# Patient Record
Sex: MALE | Race: WHITE | NOT HISPANIC OR LATINO | Employment: FULL TIME | URBAN - METROPOLITAN AREA
[De-identification: names, ages, dates, MRNs, and addresses within clinical notes are randomized per-mention and may not be internally consistent; named-entity substitution may affect disease eponyms.]

---

## 2023-03-16 ENCOUNTER — TELEPHONE (OUTPATIENT)
Dept: PRIMARY CARE | Facility: CLINIC | Age: 53
End: 2023-03-16
Payer: COMMERCIAL

## 2023-03-16 DIAGNOSIS — M54.50 CHRONIC LOW BACK PAIN WITHOUT SCIATICA, UNSPECIFIED BACK PAIN LATERALITY: Primary | ICD-10-CM

## 2023-03-16 DIAGNOSIS — G89.29 CHRONIC LOW BACK PAIN WITHOUT SCIATICA, UNSPECIFIED BACK PAIN LATERALITY: Primary | ICD-10-CM

## 2023-03-28 PROBLEM — R94.31 ABNORMAL EKG: Status: ACTIVE | Noted: 2023-03-28

## 2023-03-28 PROBLEM — J02.9 SORE THROAT: Status: ACTIVE | Noted: 2023-03-28

## 2023-03-28 PROBLEM — R00.2 PALPITATIONS: Status: ACTIVE | Noted: 2023-03-28

## 2023-03-28 PROBLEM — N52.9 ERECTILE DYSFUNCTION: Status: ACTIVE | Noted: 2023-03-28

## 2023-03-28 PROBLEM — S61.219A LACERATION OF FINGER, RIGHT: Status: ACTIVE | Noted: 2023-03-28

## 2023-03-28 PROBLEM — L03.314 CELLULITIS OF LEFT GROIN: Status: ACTIVE | Noted: 2023-03-28

## 2023-03-28 PROBLEM — M54.17 LUMBOSACRAL NEURITIS: Status: ACTIVE | Noted: 2023-03-28

## 2023-03-28 PROBLEM — I71.20 THORACIC AORTIC ANEURYSM (TAA) (CMS-HCC): Status: ACTIVE | Noted: 2023-03-28

## 2023-03-28 PROBLEM — J98.01 BRONCHIAL SPASMS: Status: ACTIVE | Noted: 2023-03-28

## 2023-03-28 PROBLEM — I28.8 ENLARGED PULMONARY ARTERY (MULTI): Status: ACTIVE | Noted: 2023-03-28

## 2023-03-28 PROBLEM — A08.4 VIRAL GASTROENTERITIS: Status: ACTIVE | Noted: 2023-03-28

## 2023-03-28 PROBLEM — I25.84 CORONARY ARTERY CALCIFICATION: Status: ACTIVE | Noted: 2023-03-28

## 2023-03-28 PROBLEM — F17.200 NICOTINE DEPENDENCE: Status: ACTIVE | Noted: 2023-03-28

## 2023-03-28 PROBLEM — M48.07 LUMBOSACRAL STENOSIS: Status: ACTIVE | Noted: 2023-03-28

## 2023-03-28 PROBLEM — M51.26 LUMBAR DISC HERNIATION: Status: ACTIVE | Noted: 2023-03-28

## 2023-03-28 PROBLEM — E66.811 OBESITY (BMI 30.0-34.9): Status: ACTIVE | Noted: 2023-03-28

## 2023-03-28 PROBLEM — R53.83 FATIGUE: Status: ACTIVE | Noted: 2023-03-28

## 2023-03-28 PROBLEM — D72.829 LEUKOCYTOSIS: Status: ACTIVE | Noted: 2023-03-28

## 2023-03-28 PROBLEM — L27.0 DRUG RASH: Status: ACTIVE | Noted: 2023-03-28

## 2023-03-28 PROBLEM — G89.29 CHRONIC LOW BACK PAIN: Status: ACTIVE | Noted: 2023-03-28

## 2023-03-28 PROBLEM — I25.10 CORONARY ARTERY CALCIFICATION: Status: ACTIVE | Noted: 2023-03-28

## 2023-03-28 PROBLEM — S81.802A LEG WOUND, LEFT: Status: ACTIVE | Noted: 2023-03-28

## 2023-03-28 PROBLEM — E78.5 HYPERLIPEMIA: Status: ACTIVE | Noted: 2023-03-28

## 2023-03-28 PROBLEM — M47.816 SPONDYLOSIS OF LUMBAR REGION WITHOUT MYELOPATHY OR RADICULOPATHY: Status: ACTIVE | Noted: 2023-03-28

## 2023-03-28 PROBLEM — L03.314 CELLULITIS OF GROIN, RIGHT: Status: ACTIVE | Noted: 2023-03-28

## 2023-03-28 PROBLEM — M54.50 CHRONIC LOW BACK PAIN: Status: ACTIVE | Noted: 2023-03-28

## 2023-03-28 PROBLEM — D72.829 ELEVATED WHITE BLOOD CELL COUNT: Status: ACTIVE | Noted: 2023-03-28

## 2023-03-28 PROBLEM — E66.9 OBESITY (BMI 30.0-34.9): Status: ACTIVE | Noted: 2023-03-28

## 2023-03-28 PROBLEM — T14.8XXA INFECTED WOUND: Status: ACTIVE | Noted: 2023-03-28

## 2023-03-28 PROBLEM — H66.91 OTITIS MEDIA, RIGHT: Status: ACTIVE | Noted: 2023-03-28

## 2023-03-28 PROBLEM — R79.89 ABNORMAL LFTS: Status: ACTIVE | Noted: 2023-03-28

## 2023-03-28 PROBLEM — L08.9 INFECTED WOUND: Status: ACTIVE | Noted: 2023-03-28

## 2023-03-28 PROBLEM — G47.19 EXCESSIVE DAYTIME SLEEPINESS: Status: ACTIVE | Noted: 2023-03-28

## 2023-03-28 PROBLEM — R40.0 HAS DAYTIME DROWSINESS: Status: ACTIVE | Noted: 2023-03-28

## 2023-03-28 PROBLEM — M25.561 RIGHT KNEE PAIN: Status: ACTIVE | Noted: 2023-03-28

## 2023-03-28 PROBLEM — R05.8 COUGH WITH SPUTUM: Status: ACTIVE | Noted: 2023-03-28

## 2023-03-28 RX ORDER — BUPRENORPHINE AND NALOXONE 8; 2 MG/1; MG/1
FILM, SOLUBLE BUCCAL; SUBLINGUAL
COMMUNITY
Start: 2022-03-24

## 2023-03-28 RX ORDER — ATORVASTATIN CALCIUM 20 MG/1
1 TABLET, FILM COATED ORAL NIGHTLY
COMMUNITY
Start: 2023-01-16

## 2023-03-28 RX ORDER — GABAPENTIN 800 MG/1
1 TABLET ORAL 3 TIMES DAILY
COMMUNITY
Start: 2020-09-02 | End: 2023-04-24 | Stop reason: SDUPTHER

## 2023-03-28 RX ORDER — MULTIVITAMIN
1 TABLET ORAL DAILY
COMMUNITY
End: 2024-01-30 | Stop reason: WASHOUT

## 2023-03-29 ENCOUNTER — OFFICE VISIT (OUTPATIENT)
Dept: PRIMARY CARE | Facility: CLINIC | Age: 53
End: 2023-03-29
Payer: COMMERCIAL

## 2023-03-29 VITALS
HEIGHT: 75 IN | WEIGHT: 287 LBS | HEART RATE: 92 BPM | DIASTOLIC BLOOD PRESSURE: 75 MMHG | TEMPERATURE: 96.9 F | BODY MASS INDEX: 35.68 KG/M2 | SYSTOLIC BLOOD PRESSURE: 115 MMHG

## 2023-03-29 DIAGNOSIS — J03.90 TONSILLITIS: Primary | ICD-10-CM

## 2023-03-29 PROCEDURE — 99214 OFFICE O/P EST MOD 30 MIN: CPT | Performed by: NURSE PRACTITIONER

## 2023-03-29 RX ORDER — AZITHROMYCIN 250 MG/1
TABLET, FILM COATED ORAL
Qty: 6 TABLET | Refills: 0 | Status: SHIPPED | OUTPATIENT
Start: 2023-03-29 | End: 2023-04-03

## 2023-03-29 ASSESSMENT — ENCOUNTER SYMPTOMS
CHILLS: 1
COUGH: 1
SORE THROAT: 1
OCCASIONAL FEELINGS OF UNSTEADINESS: 0
LOSS OF SENSATION IN FEET: 0
DEPRESSION: 0

## 2023-03-29 ASSESSMENT — PATIENT HEALTH QUESTIONNAIRE - PHQ9
SUM OF ALL RESPONSES TO PHQ9 QUESTIONS 1 AND 2: 0
1. LITTLE INTEREST OR PLEASURE IN DOING THINGS: NOT AT ALL
2. FEELING DOWN, DEPRESSED OR HOPELESS: NOT AT ALL

## 2023-03-29 NOTE — LETTER
March 29, 2023     Patient: Jaleel Ferris   YOB: 1970   Date of Visit: 3/29/2023       To Whom It May Concern:    Jaleel Ferris was seen in my clinic on 3/29/2023 at 8:30 am. Please excuse Jaleel for his absence from work on this day to make the appointment.  Patient may return to work on Thursday, March 30, 2023.    If you have any questions or concerns, please don't hesitate to call.         Sincerely,         Barb Cat, VIJAYA-CNP        CC: No Recipients

## 2023-03-29 NOTE — PATIENT INSTRUCTIONS
You have been diagnosed with a tonsillitis.  Strep culture have been sent we will notify you with positive results only.  Warm salt water gargles as needed.  Tylenol or ibuprofen as needed.  Increase fluids.  As previously discussed please start over-the-counter vitamin D3 2000 international units 1 tablet daily.  You may return to work on Thursday, March 30, 2023,  As long as you do not have fever

## 2023-03-29 NOTE — PROGRESS NOTES
"Subjective   Patient ID: Jaleel Ferris is a 53 y.o. male who presents for Sore Throat (Pt presents today for sore throat, green phlegm. ).    Sore Throat   Associated symptoms include coughing.      Patient complaining of feeling ill x3 days.  States he was exposed to his son who was having respiratory symptoms.  Patient has no concerns for COVID-19.  He complains of sore throat and swollen neck glands and pain with swallowing.  Able to eat and drink without difficulty.    Review of Systems   Constitutional:  Positive for chills.   HENT:  Positive for sore throat.    Respiratory:  Positive for cough.         Yellow brown phlegm       Objective   /75   Pulse 92   Temp 36.1 °C (96.9 °F)   Ht 1.905 m (6' 3\")   Wt 130 kg (287 lb)   BMI 35.87 kg/m²     Physical Exam  Vitals reviewed.   Constitutional:       Appearance: Normal appearance. He is not toxic-appearing.   HENT:      Right Ear: Tympanic membrane, ear canal and external ear normal.      Left Ear: Tympanic membrane, ear canal and external ear normal.      Mouth/Throat:      Mouth: Mucous membranes are moist.      Pharynx: Posterior oropharyngeal erythema present. No oropharyngeal exudate.      Tonsils: No tonsillar exudate or tonsillar abscesses. 2+ on the right. 2+ on the left.   Cardiovascular:      Rate and Rhythm: Normal rate and regular rhythm.   Pulmonary:      Effort: Pulmonary effort is normal.      Breath sounds: Normal breath sounds.   Lymphadenopathy:      Cervical: Cervical adenopathy present.   Skin:     General: Skin is warm and dry.   Neurological:      Mental Status: He is alert and oriented to person, place, and time.         Assessment/Plan   Problem List Items Addressed This Visit    None  Visit Diagnoses       Tonsillitis    -  Primary    Relevant Medications    azithromycin (Zithromax) 250 mg tablet    Other Relevant Orders    Group A Streptococcus, PCR            PCR  Strep culture sent.           Warm salt water gargles as " needed.           Tylenol or ibuprofen as needed.            Increase fluids            Follow-up as needed.

## 2023-04-03 PROCEDURE — 87651 STREP A DNA AMP PROBE: CPT

## 2023-04-06 ENCOUNTER — TELEPHONE (OUTPATIENT)
Dept: PRIMARY CARE | Facility: CLINIC | Age: 53
End: 2023-04-06
Payer: COMMERCIAL

## 2023-04-06 LAB — GROUP A STREP, PCR: DETECTED

## 2023-04-06 NOTE — TELEPHONE ENCOUNTER
Patient notified of lab results.  Patient was treated with Z-Pj during visit.  Patient states he is feeling much better.  No longer have sore throat.

## 2023-04-06 NOTE — TELEPHONE ENCOUNTER
LAB called, states positive STREP. Informed us they updated our phone number in their system, so no further test results are delayed.

## 2023-04-24 ENCOUNTER — TELEPHONE (OUTPATIENT)
Dept: PRIMARY CARE | Facility: CLINIC | Age: 53
End: 2023-04-24
Payer: COMMERCIAL

## 2023-04-24 DIAGNOSIS — G89.29 OTHER CHRONIC PAIN: Primary | ICD-10-CM

## 2023-04-24 RX ORDER — GABAPENTIN 800 MG/1
800 TABLET ORAL 3 TIMES DAILY
Qty: 180 TABLET | Refills: 2 | Status: SHIPPED | OUTPATIENT
Start: 2023-04-24 | End: 2023-10-06 | Stop reason: SDUPTHER

## 2023-04-24 NOTE — TELEPHONE ENCOUNTER
Rx Refill Request Telephone Encounter    Name:  Jaleel Ferris  :  [id number]  Medication Name:  GABAPENTIN 800MG ORAL TABLETS            Specific Pharmacy location:  [facility]  Date of last appointment:    Date of next appointment:    Best number to reach patient:

## 2023-10-05 DIAGNOSIS — G89.29 OTHER CHRONIC PAIN: ICD-10-CM

## 2023-10-05 NOTE — TELEPHONE ENCOUNTER
Rx Refill Request Telephone Encounter    Name:  Jaleel Ferris  :  [id number]  Medication Name:        GABAPENTIN 800 MG TABLET      Specific Pharmacy location:  [facility] [place] [place]  Date of last appointment:  23  Date of next appointment:  10/10/23  Best number to reach patient:

## 2023-10-06 RX ORDER — GABAPENTIN 800 MG/1
800 TABLET ORAL 3 TIMES DAILY
Qty: 180 TABLET | Refills: 1 | OUTPATIENT
Start: 2023-10-06

## 2023-10-06 RX ORDER — GABAPENTIN 800 MG/1
800 TABLET ORAL 3 TIMES DAILY
Qty: 180 TABLET | Refills: 1 | Status: SHIPPED | OUTPATIENT
Start: 2023-10-06 | End: 2024-01-30 | Stop reason: SDUPTHER

## 2023-10-10 ENCOUNTER — APPOINTMENT (OUTPATIENT)
Dept: PRIMARY CARE | Facility: CLINIC | Age: 53
End: 2023-10-10
Payer: COMMERCIAL

## 2023-10-11 ENCOUNTER — OFFICE VISIT (OUTPATIENT)
Dept: PRIMARY CARE | Facility: CLINIC | Age: 53
End: 2023-10-11
Payer: COMMERCIAL

## 2023-10-11 VITALS
DIASTOLIC BLOOD PRESSURE: 82 MMHG | RESPIRATION RATE: 18 BRPM | HEART RATE: 72 BPM | WEIGHT: 285.5 LBS | BODY MASS INDEX: 35.69 KG/M2 | TEMPERATURE: 98.3 F | OXYGEN SATURATION: 99 % | SYSTOLIC BLOOD PRESSURE: 126 MMHG

## 2023-10-11 DIAGNOSIS — M54.50 CHRONIC LOW BACK PAIN WITHOUT SCIATICA, UNSPECIFIED BACK PAIN LATERALITY: ICD-10-CM

## 2023-10-11 DIAGNOSIS — J02.9 PHARYNGITIS, UNSPECIFIED ETIOLOGY: Primary | ICD-10-CM

## 2023-10-11 DIAGNOSIS — G89.29 CHRONIC LOW BACK PAIN WITHOUT SCIATICA, UNSPECIFIED BACK PAIN LATERALITY: ICD-10-CM

## 2023-10-11 DIAGNOSIS — J02.9 SORE THROAT: ICD-10-CM

## 2023-10-11 PROCEDURE — 87651 STREP A DNA AMP PROBE: CPT

## 2023-10-11 PROCEDURE — 99214 OFFICE O/P EST MOD 30 MIN: CPT | Performed by: NURSE PRACTITIONER

## 2023-10-11 RX ORDER — LIDOCAINE 50 MG/G
1 PATCH TOPICAL DAILY
Qty: 30 PATCH | Refills: 1 | Status: SHIPPED | OUTPATIENT
Start: 2023-10-11 | End: 2024-01-30 | Stop reason: ALTCHOICE

## 2023-10-11 ASSESSMENT — ENCOUNTER SYMPTOMS
FEVER: 0
SORE THROAT: 1
BACK PAIN: 1
DYSURIA: 0
HEMATURIA: 0
CHILLS: 0

## 2023-10-11 ASSESSMENT — PAIN SCALES - GENERAL: PAINLEVEL: 8

## 2023-10-11 NOTE — PATIENT INSTRUCTIONS
Warm salt water gargles as needed for sore throat pain.  Tylenol as needed.  Will refer to medical spine as needed.  Lidocaine Patch as needed.  Back exercises- Ice/could go back tomorrow  Heat as needed.  Follow-up as needed.

## 2023-10-11 NOTE — PROGRESS NOTES
Subjective   Patient ID: Jaleel Ferris is a 53 y.o. male who presents for Back Pain (Reports pain did subside. Wanted to try some things to get pain to go away. Lower back. ) and Sore Throat (Thinks he may have strep throat. Reports ongoing for about 3 days. Last night and today worst. Started out with coughing, but now patient reports having trouble swallowing. No N/V/D currently, did have at beginning of sickness. No vomiting currently. ).    Back Pain  Pertinent negatives include no dysuria or fever.   Sore Throat        Patient presents to clinic with history of chronic back pain back pain has improved.  Declines referral to medical spine states he would like to lose some weight.  He has previously had physical therapy for his back-last physical therapy 5 months ago.     Today patient complains of sore throat x3 days states he has pain with swallowing.  He denies fevers ear pain cough.  He denies any ill contacts.        Review of Systems   Constitutional:  Negative for chills and fever.   HENT:  Positive for sore throat.    Genitourinary:  Negative for dysuria, hematuria and urgency.   Musculoskeletal:  Positive for back pain.   All other systems reviewed and are negative.      Objective   /82 (BP Location: Right arm, Patient Position: Sitting, BP Cuff Size: Large adult)   Pulse 72   Temp 36.8 °C (98.3 °F) (Temporal)   Resp 18   Wt 130 kg (285 lb 8 oz)   SpO2 99%   BMI 35.69 kg/m²     Physical Exam  Vitals reviewed.   Constitutional:       Appearance: Normal appearance. He is not ill-appearing or toxic-appearing.   HENT:      Right Ear: Tympanic membrane and ear canal normal.      Left Ear: Tympanic membrane and ear canal normal.      Mouth/Throat:      Pharynx: Oropharynx is clear. No oropharyngeal exudate or posterior oropharyngeal erythema.      Comments: Tonsil size 1  Cardiovascular:      Rate and Rhythm: Normal rate and regular rhythm.   Pulmonary:      Effort: Pulmonary effort is normal.       Breath sounds: Normal breath sounds.   Musculoskeletal:         General: Normal range of motion.      Comments: Back nontender to touch.   Skin:     General: Skin is warm and dry.   Neurological:      Mental Status: He is alert and oriented to person, place, and time.         Assessment/Plan   Problem List Items Addressed This Visit    None  Visit Diagnoses       Pharyngitis, unspecified etiology    -  Primary    Chronic low back pain without sciatica, unspecified back pain laterality        Relevant Medications    lidocaine (Lidoderm) 5 % patch    Pharyngitis/Sore throat        Relevant Orders    Group A Streptococcus, PCR  Warm salt water gargles as needed  Tylenol ibuprofen as needed  Increase fluids

## 2023-10-11 NOTE — LETTER
October 11, 2023     Patient: Jaleel Ferris   YOB: 1970   Date of Visit: 10/11/2023       To Whom It May Concern:    Jaleel Ferris was seen in my clinic on 10/11/2023 at 9:00 am. Please excuse Jaleel for his absence from work on this day to make the appointment. Jaleel may return to work tomorrow, 10/11/2023.    If you have any questions or concerns, please don't hesitate to call.         Sincerely,         Barb Cat, VIJAYA-CNP

## 2023-10-12 LAB — S PYO DNA THROAT QL NAA+PROBE: NOT DETECTED

## 2024-01-30 ENCOUNTER — OFFICE VISIT (OUTPATIENT)
Dept: PRIMARY CARE | Facility: CLINIC | Age: 54
End: 2024-01-30
Payer: COMMERCIAL

## 2024-01-30 VITALS
SYSTOLIC BLOOD PRESSURE: 124 MMHG | DIASTOLIC BLOOD PRESSURE: 72 MMHG | BODY MASS INDEX: 37.25 KG/M2 | HEART RATE: 79 BPM | RESPIRATION RATE: 18 BRPM | OXYGEN SATURATION: 100 % | TEMPERATURE: 97.3 F | WEIGHT: 298 LBS

## 2024-01-30 DIAGNOSIS — Z92.29 HISTORY OF LONG-TERM USE OF MULTIPLE PRESCRIPTION DRUGS: ICD-10-CM

## 2024-01-30 DIAGNOSIS — M54.50 CHRONIC LOW BACK PAIN WITHOUT SCIATICA, UNSPECIFIED BACK PAIN LATERALITY: Primary | ICD-10-CM

## 2024-01-30 DIAGNOSIS — M25.562 CHRONIC PAIN OF BOTH KNEES: ICD-10-CM

## 2024-01-30 DIAGNOSIS — G89.4 CHRONIC PAIN SYNDROME: ICD-10-CM

## 2024-01-30 DIAGNOSIS — Z00.00 ANNUAL PHYSICAL EXAM: ICD-10-CM

## 2024-01-30 DIAGNOSIS — G89.29 CHRONIC LOW BACK PAIN WITHOUT SCIATICA, UNSPECIFIED BACK PAIN LATERALITY: Primary | ICD-10-CM

## 2024-01-30 DIAGNOSIS — G89.29 CHRONIC PAIN OF BOTH KNEES: ICD-10-CM

## 2024-01-30 DIAGNOSIS — G89.29 OTHER CHRONIC PAIN: ICD-10-CM

## 2024-01-30 DIAGNOSIS — Z12.11 COLON CANCER SCREENING: ICD-10-CM

## 2024-01-30 DIAGNOSIS — I28.8 ENLARGED PULMONARY ARTERY (MULTI): ICD-10-CM

## 2024-01-30 DIAGNOSIS — M25.561 CHRONIC PAIN OF BOTH KNEES: ICD-10-CM

## 2024-01-30 PROBLEM — F17.200 NICOTINE DEPENDENCE: Status: RESOLVED | Noted: 2023-03-28 | Resolved: 2024-01-30

## 2024-01-30 PROCEDURE — 99396 PREV VISIT EST AGE 40-64: CPT | Performed by: FAMILY MEDICINE

## 2024-01-30 RX ORDER — GABAPENTIN 800 MG/1
800 TABLET ORAL 3 TIMES DAILY
Qty: 180 TABLET | Refills: 1 | Status: SHIPPED | OUTPATIENT
Start: 2024-01-30 | End: 2024-05-20

## 2024-01-30 NOTE — PROGRESS NOTES
Subjective   Patient ID: Jaleel Ferris is a 53 y.o. male who presents for Establish Care and New Patient Visit.    HPI :  [facility] patient    Medication refill: gabapentin  Renew disability placard.  Chronic pain: on suboxone ( h/o percocet use)    Review of Systems   All other systems reviewed and are negative.      Objective   /72 (BP Location: Left arm, Patient Position: Sitting, BP Cuff Size: Large adult)   Pulse 79   Temp 36.3 °C (97.3 °F) (Temporal)   Resp 18   Wt 135 kg (298 lb)   SpO2 100%   BMI 37.25 kg/m²     Physical Exam  Vitals and nursing note reviewed.   Neurological:      Mental Status: He is alert.   Psychiatric:         Speech: Speech is rapid and pressured.         Behavior: Behavior is hyperactive.         Cognition and Memory: Cognition is impaired.         Assessment/Plan   Diagnoses and all orders for this visit:  Chronic low back pain without sciatica, unspecified back pain laterality  -     Disability Placard  -     gabapentin (Neurontin) 800 mg tablet; Take 1 tablet (800 mg) by mouth 3 times a day.  Chronic pain of both knees  -     Disability Placard  Enlarged pulmonary artery (CMS/HCC)  Annual physical exam  -     PSA; Future  -     Comprehensive Metabolic Panel; Future  -     CBC and Auto Differential; Future  -     TSH; Future  -     Lipid panel; Future  Colon cancer screening  -     Colonoscopy Screening; Average Risk Patient; Future  Other chronic pain  -     gabapentin (Neurontin) 800 mg tablet; Take 1 tablet (800 mg) by mouth 3 times a day.  Chronic pain syndrome  History of long-term use of multiple prescription drugs  Other orders  -     Follow Up In Primary Care - Established; Future

## 2024-02-05 DIAGNOSIS — Z12.11 COLON CANCER SCREENING: Primary | ICD-10-CM

## 2024-02-05 RX ORDER — SOD SULF/POT CHLORIDE/MAG SULF 1.479 G
12 TABLET ORAL 2 TIMES DAILY
Qty: 24 TABLET | Refills: 0 | Status: SHIPPED | OUTPATIENT
Start: 2024-02-05 | End: 2024-02-06

## 2024-02-06 ENCOUNTER — LAB (OUTPATIENT)
Dept: LAB | Facility: LAB | Age: 54
End: 2024-02-06
Payer: COMMERCIAL

## 2024-02-06 DIAGNOSIS — Z00.00 ANNUAL PHYSICAL EXAM: ICD-10-CM

## 2024-02-06 LAB
ALBUMIN SERPL BCP-MCNC: 4.3 G/DL (ref 3.4–5)
ALP SERPL-CCNC: 141 U/L (ref 33–120)
ALT SERPL W P-5'-P-CCNC: 16 U/L (ref 10–52)
ANION GAP SERPL CALC-SCNC: 12 MMOL/L (ref 10–20)
AST SERPL W P-5'-P-CCNC: 14 U/L (ref 9–39)
BASOPHILS # BLD AUTO: 0.1 X10*3/UL (ref 0–0.1)
BASOPHILS NFR BLD AUTO: 1 %
BILIRUB SERPL-MCNC: 0.3 MG/DL (ref 0–1.2)
BUN SERPL-MCNC: 18 MG/DL (ref 6–23)
CALCIUM SERPL-MCNC: 9.7 MG/DL (ref 8.6–10.3)
CHLORIDE SERPL-SCNC: 100 MMOL/L (ref 98–107)
CHOLEST SERPL-MCNC: 215 MG/DL (ref 0–199)
CHOLESTEROL/HDL RATIO: 5.7
CO2 SERPL-SCNC: 32 MMOL/L (ref 21–32)
CREAT SERPL-MCNC: 0.7 MG/DL (ref 0.5–1.3)
EGFRCR SERPLBLD CKD-EPI 2021: >90 ML/MIN/1.73M*2
EOSINOPHIL # BLD AUTO: 0.21 X10*3/UL (ref 0–0.7)
EOSINOPHIL NFR BLD AUTO: 2 %
ERYTHROCYTE [DISTWIDTH] IN BLOOD BY AUTOMATED COUNT: 11.9 % (ref 11.5–14.5)
GLUCOSE SERPL-MCNC: 107 MG/DL (ref 74–99)
HCT VFR BLD AUTO: 40.2 % (ref 41–52)
HDLC SERPL-MCNC: 37.6 MG/DL
HGB BLD-MCNC: 13.2 G/DL (ref 13.5–17.5)
IMM GRANULOCYTES # BLD AUTO: 0.02 X10*3/UL (ref 0–0.7)
IMM GRANULOCYTES NFR BLD AUTO: 0.2 % (ref 0–0.9)
LDLC SERPL CALC-MCNC: 118 MG/DL
LYMPHOCYTES # BLD AUTO: 2.92 X10*3/UL (ref 1.2–4.8)
LYMPHOCYTES NFR BLD AUTO: 28.3 %
MCH RBC QN AUTO: 30.6 PG (ref 26–34)
MCHC RBC AUTO-ENTMCNC: 32.8 G/DL (ref 32–36)
MCV RBC AUTO: 93 FL (ref 80–100)
MONOCYTES # BLD AUTO: 0.85 X10*3/UL (ref 0.1–1)
MONOCYTES NFR BLD AUTO: 8.2 %
NEUTROPHILS # BLD AUTO: 6.23 X10*3/UL (ref 1.2–7.7)
NEUTROPHILS NFR BLD AUTO: 60.3 %
NON HDL CHOLESTEROL: 177 MG/DL (ref 0–149)
NRBC BLD-RTO: 0 /100 WBCS (ref 0–0)
PLATELET # BLD AUTO: 319 X10*3/UL (ref 150–450)
POTASSIUM SERPL-SCNC: 4.9 MMOL/L (ref 3.5–5.3)
PROT SERPL-MCNC: 7.3 G/DL (ref 6.4–8.2)
PSA SERPL-MCNC: 0.41 NG/ML
RBC # BLD AUTO: 4.31 X10*6/UL (ref 4.5–5.9)
SODIUM SERPL-SCNC: 139 MMOL/L (ref 136–145)
TRIGL SERPL-MCNC: 295 MG/DL (ref 0–149)
TSH SERPL-ACNC: 3.99 MIU/L (ref 0.44–3.98)
VLDL: 59 MG/DL (ref 0–40)
WBC # BLD AUTO: 10.3 X10*3/UL (ref 4.4–11.3)

## 2024-02-06 PROCEDURE — 80061 LIPID PANEL: CPT

## 2024-02-06 PROCEDURE — 84153 ASSAY OF PSA TOTAL: CPT

## 2024-02-06 PROCEDURE — 36415 COLL VENOUS BLD VENIPUNCTURE: CPT

## 2024-02-06 PROCEDURE — 84443 ASSAY THYROID STIM HORMONE: CPT

## 2024-02-06 PROCEDURE — 80053 COMPREHEN METABOLIC PANEL: CPT

## 2024-02-06 PROCEDURE — 85025 COMPLETE CBC W/AUTO DIFF WBC: CPT

## 2024-02-08 ENCOUNTER — TELEPHONE (OUTPATIENT)
Dept: PRIMARY CARE | Facility: CLINIC | Age: 54
End: 2024-02-08
Payer: COMMERCIAL

## 2024-02-08 NOTE — TELEPHONE ENCOUNTER
----- Message from Fernando SWANN MD sent at 2/7/2024  8:53 AM EST -----  Notify patient , all results compared to year ago. 1.Improved thyroid function 2. Alkaline phosphatase is same 2. Cholestrol same  3. Anemia improved . 4. Normal PSA

## 2024-05-18 DIAGNOSIS — M54.50 CHRONIC LOW BACK PAIN WITHOUT SCIATICA, UNSPECIFIED BACK PAIN LATERALITY: ICD-10-CM

## 2024-05-18 DIAGNOSIS — G89.29 CHRONIC LOW BACK PAIN WITHOUT SCIATICA, UNSPECIFIED BACK PAIN LATERALITY: ICD-10-CM

## 2024-05-18 DIAGNOSIS — G89.29 OTHER CHRONIC PAIN: ICD-10-CM

## 2024-05-20 DIAGNOSIS — G89.29 OTHER CHRONIC PAIN: ICD-10-CM

## 2024-05-20 DIAGNOSIS — G89.29 CHRONIC LOW BACK PAIN WITHOUT SCIATICA, UNSPECIFIED BACK PAIN LATERALITY: ICD-10-CM

## 2024-05-20 DIAGNOSIS — M54.50 CHRONIC LOW BACK PAIN WITHOUT SCIATICA, UNSPECIFIED BACK PAIN LATERALITY: ICD-10-CM

## 2024-05-20 RX ORDER — GABAPENTIN 800 MG/1
TABLET ORAL
Qty: 180 TABLET | Refills: 1 | Status: SHIPPED | OUTPATIENT
Start: 2024-05-20

## 2024-05-20 RX ORDER — GABAPENTIN 800 MG/1
800 TABLET ORAL 3 TIMES DAILY
Qty: 180 TABLET | Refills: 1 | OUTPATIENT
Start: 2024-05-20

## 2024-06-27 ENCOUNTER — TELEPHONE (OUTPATIENT)
Dept: PRIMARY CARE | Facility: CLINIC | Age: 54
End: 2024-06-27
Payer: COMMERCIAL

## 2024-06-28 DIAGNOSIS — M25.561 CHRONIC PAIN OF BOTH KNEES: ICD-10-CM

## 2024-06-28 DIAGNOSIS — G89.29 CHRONIC LOW BACK PAIN WITHOUT SCIATICA, UNSPECIFIED BACK PAIN LATERALITY: Primary | ICD-10-CM

## 2024-06-28 DIAGNOSIS — G89.29 CHRONIC PAIN OF BOTH KNEES: ICD-10-CM

## 2024-06-28 DIAGNOSIS — M54.50 CHRONIC LOW BACK PAIN WITHOUT SCIATICA, UNSPECIFIED BACK PAIN LATERALITY: Primary | ICD-10-CM

## 2024-06-28 DIAGNOSIS — M25.562 CHRONIC PAIN OF BOTH KNEES: ICD-10-CM

## 2024-07-09 ENCOUNTER — TELEPHONE (OUTPATIENT)
Dept: PRIMARY CARE | Facility: CLINIC | Age: 54
End: 2024-07-09
Payer: COMMERCIAL

## 2024-07-22 ENCOUNTER — APPOINTMENT (OUTPATIENT)
Dept: PRIMARY CARE | Facility: CLINIC | Age: 54
End: 2024-07-22
Payer: COMMERCIAL

## 2024-07-25 ENCOUNTER — HOSPITAL ENCOUNTER (EMERGENCY)
Facility: HOSPITAL | Age: 54
Discharge: HOME | End: 2024-07-25
Payer: COMMERCIAL

## 2024-07-25 VITALS
SYSTOLIC BLOOD PRESSURE: 117 MMHG | WEIGHT: 300 LBS | OXYGEN SATURATION: 94 % | BODY MASS INDEX: 37.3 KG/M2 | HEART RATE: 94 BPM | TEMPERATURE: 97.5 F | DIASTOLIC BLOOD PRESSURE: 75 MMHG | RESPIRATION RATE: 18 BRPM | HEIGHT: 75 IN

## 2024-07-25 DIAGNOSIS — B34.9 VIRAL ILLNESS: Primary | ICD-10-CM

## 2024-07-25 LAB
FLUAV RNA RESP QL NAA+PROBE: NOT DETECTED
FLUBV RNA RESP QL NAA+PROBE: NOT DETECTED
S PYO DNA THROAT QL NAA+PROBE: NOT DETECTED
SARS-COV-2 RNA RESP QL NAA+PROBE: NOT DETECTED

## 2024-07-25 PROCEDURE — 87651 STREP A DNA AMP PROBE: CPT | Performed by: NURSE PRACTITIONER

## 2024-07-25 PROCEDURE — 99283 EMERGENCY DEPT VISIT LOW MDM: CPT

## 2024-07-25 PROCEDURE — 87502 INFLUENZA DNA AMP PROBE: CPT | Performed by: NURSE PRACTITIONER

## 2024-07-25 ASSESSMENT — LIFESTYLE VARIABLES
EVER FELT BAD OR GUILTY ABOUT YOUR DRINKING: NO
HAVE YOU EVER FELT YOU SHOULD CUT DOWN ON YOUR DRINKING: NO
EVER HAD A DRINK FIRST THING IN THE MORNING TO STEADY YOUR NERVES TO GET RID OF A HANGOVER: NO
HAVE PEOPLE ANNOYED YOU BY CRITICIZING YOUR DRINKING: NO
TOTAL SCORE: 0

## 2024-07-25 ASSESSMENT — COLUMBIA-SUICIDE SEVERITY RATING SCALE - C-SSRS
1. IN THE PAST MONTH, HAVE YOU WISHED YOU WERE DEAD OR WISHED YOU COULD GO TO SLEEP AND NOT WAKE UP?: NO
6. HAVE YOU EVER DONE ANYTHING, STARTED TO DO ANYTHING, OR PREPARED TO DO ANYTHING TO END YOUR LIFE?: NO
2. HAVE YOU ACTUALLY HAD ANY THOUGHTS OF KILLING YOURSELF?: NO

## 2024-07-25 NOTE — Clinical Note
Jaleel Ferris was seen and treated in our emergency department on 7/25/2024.  He may return to work on 07/26/2024.       If you have any questions or concerns, please don't hesitate to call.      Dee Dee Gilmore, APRN-CNP

## 2024-07-25 NOTE — DISCHARGE INSTRUCTIONS
COVID, influenza and strep are all negative.  Your symptoms are likely due to a mild viral illness.  Increase fluids.  Motrin/Tylenol.  Rest.  Follow-up with PCP in the next 2 to 3 days.  Off work today, may return tomorrow.

## 2024-07-25 NOTE — ED PROVIDER NOTES
Limitations to History: None     HPI:      Jaleel Ferris is a 54 y.o. with no significant past medical history presenting to ED today from home by himself for evaluation of flulike symptoms.  Yesterday at work, the patient began feeling weak, today he developed a mild headache, sore throat, nausea and 1 episode of vomiting.  No sick contacts.  Motrin taken prior to arrival provided mildly for symptoms.  Denies fever/chills, cough, chest pain, shortness of breath, abdominal pain, urinary symptoms, change in bowel habits or any other complaints.  No smoking, EtOH or drug use.  PCP is Dr. King.      Additional History Obtained from: None     ------------------------------------------------------------------------------------------------------------------------------------------    VS: As documented in the triage note and EMR flowsheet from this visit were reviewed.    Physical Exam:  Gen: 54-year-old male, appears slightly tired but nontoxic.  Awake and alert, oriented x 3.  Well-nourished and hydrated.  Head/Neck: NCAT, neck w/ FROM.  No lymphadenopathy.  Eyes: EOMI, PERRL, anicteric sclerae, noninjected conjunctivae  Ears: TMs clear b/l without sign of infection  Nose: Nares patent w/o rhinorrhea  Mouth:  MMM, no OP lesions noted.  Posterior pharynx is mildly erythematous, tonsils atrophied, no exudate, uvula midline.  Heart: RRR no MRG  Lungs: CTA b/l no RRW, no increased work of breathing  Abdomen: soft, NT, ND, no HSM, no palpable masses  Musculoskeletal: ROBY x 4.  MSPs intact.  Skin intact.  No deformities.  Neurologic: Alert, symmetrical facies, phonates clearly, moves all extremities equally, responsive to touch, ambulates normally   Skin: Pink, warm and dry.  No rashes noted        ------------------------------------------------------------------------------------------------------------------------------------------    Medical Decision Makin-year-old male who is otherwise healthy is evaluated the  bedside for flulike symptoms that began yesterday including weakness/fatigue, mild headache, sore throat, nausea and 1 episode of vomiting.  On arrival to the ED, blood pressure 117/75 with a heart rate of 94, patient is not hypoxic or febrile.  Lungs clear, abdomen soft and nontender.  Posterior oropharynx is mildly erythematous.  No lymphadenopathy.  Differential includes but is not limited to influenza, strep and COVID.  Given oral fluids.  Viral/strep swabs pending.      ED Course as of 07/25/24 1135   Thu Jul 25, 2024   1132 Laboratory studies were reviewed, COVID, influenza and strep are all negative.  Patient likely has a viral illness that is contributing to his symptoms.  Repeat vital signs within normal limits.  I feel comfortable discharging the patient home.  Follow-up with PCP in the next 2 to 3 days.  Increase fluids.  Rest.  Motrin/Tylenol.  Off work today, may return tomorrow.  Return precautions discussed.  Diagnosis, treatment and plan discussed with patient, he verbalizes understanding and is in agreement.  Condition stable for discharge. [SB]      ED Course User Index  [SB] TREMAYNE Roberts         Diagnoses as of 07/25/24 1135   Viral illness       EKG interpreted by myself (ED attending physician): Not ordered    Chronic Medical Conditions Significantly Affecting Care: None    External Records Reviewed: I reviewed recent and relevant outside records including: None    Discussion of Management with Other Providers: None    I discussed the patient/results with: None       TREMAYNE Roberts  07/25/24 1135

## 2024-07-25 NOTE — ED TRIAGE NOTES
Patient arrives from home with complaints of weakness and generalized tiredness that has been ongoing for the past 2 days.  Patient also complains of sore throat and states he vomited yesterday x1.  Patient also states he has a co worker who tested + for covid a few days ago.

## 2024-07-30 ENCOUNTER — APPOINTMENT (OUTPATIENT)
Dept: PRIMARY CARE | Facility: CLINIC | Age: 54
End: 2024-07-30
Payer: COMMERCIAL

## 2024-08-05 ENCOUNTER — APPOINTMENT (OUTPATIENT)
Dept: PRIMARY CARE | Facility: CLINIC | Age: 54
End: 2024-08-05
Payer: COMMERCIAL

## 2024-08-08 ENCOUNTER — APPOINTMENT (OUTPATIENT)
Dept: PRIMARY CARE | Facility: CLINIC | Age: 54
End: 2024-08-08
Payer: COMMERCIAL

## 2024-08-12 ENCOUNTER — APPOINTMENT (OUTPATIENT)
Dept: PRIMARY CARE | Facility: CLINIC | Age: 54
End: 2024-08-12
Payer: COMMERCIAL

## 2024-08-19 ENCOUNTER — APPOINTMENT (OUTPATIENT)
Dept: PRIMARY CARE | Facility: CLINIC | Age: 54
End: 2024-08-19
Payer: COMMERCIAL

## 2024-08-19 VITALS
HEART RATE: 75 BPM | RESPIRATION RATE: 18 BRPM | WEIGHT: 299 LBS | DIASTOLIC BLOOD PRESSURE: 89 MMHG | SYSTOLIC BLOOD PRESSURE: 139 MMHG | BODY MASS INDEX: 38.37 KG/M2 | OXYGEN SATURATION: 96 % | HEIGHT: 74 IN | TEMPERATURE: 97.9 F

## 2024-08-19 DIAGNOSIS — Z00.00 ANNUAL PHYSICAL EXAM: Primary | ICD-10-CM

## 2024-08-19 PROCEDURE — 99396 PREV VISIT EST AGE 40-64: CPT | Performed by: FAMILY MEDICINE

## 2024-08-19 PROCEDURE — 3008F BODY MASS INDEX DOCD: CPT | Performed by: FAMILY MEDICINE

## 2024-08-19 ASSESSMENT — PATIENT HEALTH QUESTIONNAIRE - PHQ9
SUM OF ALL RESPONSES TO PHQ9 QUESTIONS 1 AND 2: 0
2. FEELING DOWN, DEPRESSED OR HOPELESS: NOT AT ALL
1. LITTLE INTEREST OR PLEASURE IN DOING THINGS: NOT AT ALL

## 2024-08-19 ASSESSMENT — PAIN SCALES - GENERAL: PAINLEVEL: 6

## 2024-08-19 NOTE — PROGRESS NOTES
"Subjective   Patient ID: Jaleel Ferris is a 54 y.o. male who presents for Annual Exam.    HPI   Annual physical   [employer] for work    Right knee pain : from arthritis for years, started to act up recently.  Low back pain : only thing that works is gabapentin  Review of Systems   All other systems reviewed and are negative.      Objective   /89 (BP Location: Left arm, Patient Position: Sitting, BP Cuff Size: Large adult)   Pulse 75   Temp 36.6 °C (97.9 °F) (Temporal)   Resp 18   Ht 1.88 m (6' 2\")   Wt 136 kg (299 lb)   SpO2 96%   BMI 38.39 kg/m²     Physical Exam  Vitals and nursing note reviewed.   Cardiovascular:      Rate and Rhythm: Normal rate and regular rhythm.   Pulmonary:      Effort: Pulmonary effort is normal.      Breath sounds: Normal breath sounds.   Musculoskeletal:      Cervical back: Neck supple.   Lymphadenopathy:      Cervical: No cervical adenopathy.   Neurological:      Mental Status: He is alert.         Assessment/Plan   Diagnoses and all orders for this visit:  Annual physical exam  Comments:  medically fit to work.  Orders:  -     Hemoglobin A1c; Future  -     Lipid panel; Future  -     Comprehensive Metabolic Panel; Future         "

## 2024-09-04 DIAGNOSIS — M54.50 CHRONIC LOW BACK PAIN WITHOUT SCIATICA, UNSPECIFIED BACK PAIN LATERALITY: ICD-10-CM

## 2024-09-04 DIAGNOSIS — G89.29 OTHER CHRONIC PAIN: ICD-10-CM

## 2024-09-04 DIAGNOSIS — G89.29 CHRONIC LOW BACK PAIN WITHOUT SCIATICA, UNSPECIFIED BACK PAIN LATERALITY: ICD-10-CM

## 2024-09-04 RX ORDER — GABAPENTIN 800 MG/1
800 TABLET ORAL 3 TIMES DAILY
Qty: 270 TABLET | Refills: 1 | Status: SHIPPED | OUTPATIENT
Start: 2024-09-04

## 2024-09-14 ENCOUNTER — LAB (OUTPATIENT)
Dept: LAB | Facility: LAB | Age: 54
End: 2024-09-14
Payer: COMMERCIAL

## 2024-09-14 DIAGNOSIS — Z00.00 ANNUAL PHYSICAL EXAM: ICD-10-CM

## 2024-09-14 LAB
ALBUMIN SERPL BCP-MCNC: 3.9 G/DL (ref 3.4–5)
ALP SERPL-CCNC: 145 U/L (ref 33–120)
ALT SERPL W P-5'-P-CCNC: 23 U/L (ref 10–52)
ANION GAP SERPL CALC-SCNC: 9 MMOL/L (ref 10–20)
AST SERPL W P-5'-P-CCNC: 18 U/L (ref 9–39)
BILIRUB SERPL-MCNC: 0.4 MG/DL (ref 0–1.2)
BUN SERPL-MCNC: 13 MG/DL (ref 6–23)
CALCIUM SERPL-MCNC: 8.6 MG/DL (ref 8.6–10.3)
CHLORIDE SERPL-SCNC: 104 MMOL/L (ref 98–107)
CHOLEST SERPL-MCNC: 198 MG/DL (ref 0–199)
CHOLESTEROL/HDL RATIO: 5.4
CO2 SERPL-SCNC: 32 MMOL/L (ref 21–32)
CREAT SERPL-MCNC: 0.72 MG/DL (ref 0.5–1.3)
EGFRCR SERPLBLD CKD-EPI 2021: >90 ML/MIN/1.73M*2
GLUCOSE SERPL-MCNC: 129 MG/DL (ref 74–99)
HDLC SERPL-MCNC: 37 MG/DL
LDLC SERPL CALC-MCNC: 126 MG/DL
NON HDL CHOLESTEROL: 161 MG/DL (ref 0–149)
POTASSIUM SERPL-SCNC: 4.6 MMOL/L (ref 3.5–5.3)
PROT SERPL-MCNC: 7 G/DL (ref 6.4–8.2)
SODIUM SERPL-SCNC: 140 MMOL/L (ref 136–145)
TRIGL SERPL-MCNC: 173 MG/DL (ref 0–149)
VLDL: 35 MG/DL (ref 0–40)

## 2024-09-14 PROCEDURE — 80061 LIPID PANEL: CPT

## 2024-09-14 PROCEDURE — 36415 COLL VENOUS BLD VENIPUNCTURE: CPT

## 2024-09-14 PROCEDURE — 83036 HEMOGLOBIN GLYCOSYLATED A1C: CPT

## 2024-09-14 PROCEDURE — 80053 COMPREHEN METABOLIC PANEL: CPT

## 2024-09-15 LAB
EST. AVERAGE GLUCOSE BLD GHB EST-MCNC: 131 MG/DL
HBA1C MFR BLD: 6.2 %

## 2024-09-17 ENCOUNTER — TELEPHONE (OUTPATIENT)
Dept: PRIMARY CARE | Facility: CLINIC | Age: 54
End: 2024-09-17
Payer: COMMERCIAL

## 2024-09-17 NOTE — TELEPHONE ENCOUNTER
----- Message from Fernando Dial sent at 9/16/2024  3:27 PM EDT -----  Notify patient his blood sugars are in prediabetic range. Healthy diet, portion control, low fat and low carbohydrate meals with small portion is important.

## 2024-09-17 NOTE — LETTER
September 17, 2024     Good afternoon Jaleel Ferris per Dr. King your labs: patient his blood sugars are in prediabetic range. Healthy diet, portion control, low fat and low carbohydrate meals with small portion is important.      Any questions please call the office.    Thank you,   ISABEL ODEN LPN

## 2024-09-19 ENCOUNTER — ANCILLARY PROCEDURE (OUTPATIENT)
Dept: URGENT CARE | Age: 54
End: 2024-09-19
Payer: COMMERCIAL

## 2024-09-19 ENCOUNTER — APPOINTMENT (OUTPATIENT)
Dept: PRIMARY CARE | Facility: CLINIC | Age: 54
End: 2024-09-19
Payer: COMMERCIAL

## 2024-09-19 ENCOUNTER — OFFICE VISIT (OUTPATIENT)
Dept: URGENT CARE | Age: 54
End: 2024-09-19
Payer: COMMERCIAL

## 2024-09-19 VITALS
HEIGHT: 74 IN | HEART RATE: 61 BPM | OXYGEN SATURATION: 98 % | BODY MASS INDEX: 37.99 KG/M2 | TEMPERATURE: 97.7 F | WEIGHT: 296 LBS | SYSTOLIC BLOOD PRESSURE: 148 MMHG | RESPIRATION RATE: 16 BRPM | DIASTOLIC BLOOD PRESSURE: 102 MMHG

## 2024-09-19 DIAGNOSIS — M77.51 BURSITIS OF HEEL, RIGHT: ICD-10-CM

## 2024-09-19 DIAGNOSIS — M79.671 PAIN OF RIGHT HEEL: Primary | ICD-10-CM

## 2024-09-19 DIAGNOSIS — M79.671 PAIN OF RIGHT HEEL: ICD-10-CM

## 2024-09-19 PROCEDURE — 73630 X-RAY EXAM OF FOOT: CPT | Mod: RIGHT SIDE | Performed by: PHYSICIAN ASSISTANT

## 2024-09-19 ASSESSMENT — PATIENT HEALTH QUESTIONNAIRE - PHQ9
1. LITTLE INTEREST OR PLEASURE IN DOING THINGS: NOT AT ALL
2. FEELING DOWN, DEPRESSED OR HOPELESS: NOT AT ALL
SUM OF ALL RESPONSES TO PHQ9 QUESTIONS 1 AND 2: 0
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
## 2024-09-19 NOTE — LETTER
September 19, 2024     Patient: Jaleel Ferris   YOB: 1970   Date of Visit: 9/19/2024       To Whom It May Concern:    Jaleel Ferris was seen in my clinic on 9/19/2024 at 10:30 am. Please excuse Jaleel for his absence from work on this day to make the appointment.    If you have any questions or concerns, please don't hesitate to call.         Sincerely,         Jael Amaya PA-C        CC: No Recipients

## 2024-09-19 NOTE — PATIENT INSTRUCTIONS
Please go to [facility] for xray, use podiatry referral for further evaluation of heel pain/bursitis, use shoes inserts to protect heel from steel toed shoes, care instructions, red flags, ADES and return precautions discussed and patient agreed/understood. Follow up with PCP in 3-5 days.

## 2024-09-20 ENCOUNTER — APPOINTMENT (OUTPATIENT)
Dept: PRIMARY CARE | Facility: CLINIC | Age: 54
End: 2024-09-20
Payer: COMMERCIAL

## 2024-09-20 ENCOUNTER — TELEPHONE (OUTPATIENT)
Dept: PRIMARY CARE | Facility: CLINIC | Age: 54
End: 2024-09-20

## 2024-09-20 DIAGNOSIS — M79.671 PAIN IN BOTH FEET: Primary | ICD-10-CM

## 2024-09-20 DIAGNOSIS — M79.672 PAIN IN BOTH FEET: Primary | ICD-10-CM

## 2024-09-20 NOTE — LETTER
September 20, 2024     Patient: Jaleel Ferris   YOB: 1970   Date of Visit: 9/20/2024     To whom it may concern:  Jaleel Ferris was seen in my clinic on 9/20/2024 and patient has bone spurs on right foot. Please excuse Jaleel for his absence from work from 9/20/2024 until 9/22/2024 returning to work on 9/23/2024.    If you have any questions or concerns, please don't hesitate to call.      Sincerely,      Fernando Dial M.D.

## 2024-09-25 ENCOUNTER — OFFICE VISIT (OUTPATIENT)
Dept: ORTHOPEDIC SURGERY | Facility: CLINIC | Age: 54
End: 2024-09-25
Payer: COMMERCIAL

## 2024-09-25 ENCOUNTER — HOSPITAL ENCOUNTER (OUTPATIENT)
Dept: RADIOLOGY | Facility: CLINIC | Age: 54
Discharge: HOME | End: 2024-09-25
Payer: COMMERCIAL

## 2024-09-25 DIAGNOSIS — M76.829 POSTERIOR TIBIAL TENDON DYSFUNCTION: ICD-10-CM

## 2024-09-25 DIAGNOSIS — M79.671 RIGHT FOOT PAIN: ICD-10-CM

## 2024-09-25 DIAGNOSIS — M67.88 ACHILLES TENDINOSIS: Primary | ICD-10-CM

## 2024-09-25 PROCEDURE — 73630 X-RAY EXAM OF FOOT: CPT | Mod: RT

## 2024-09-25 PROCEDURE — 99204 OFFICE O/P NEW MOD 45 MIN: CPT | Performed by: PHYSICIAN ASSISTANT

## 2024-09-25 PROCEDURE — L4361 PNEUMA/VAC WALK BOOT PRE OTS: HCPCS | Performed by: PHYSICIAN ASSISTANT

## 2024-09-25 PROCEDURE — 73630 X-RAY EXAM OF FOOT: CPT | Mod: RIGHT SIDE | Performed by: RADIOLOGY

## 2024-09-25 PROCEDURE — 99214 OFFICE O/P EST MOD 30 MIN: CPT | Performed by: PHYSICIAN ASSISTANT

## 2024-09-25 NOTE — PATIENT INSTRUCTIONS
YOUR BOOT INSTRUCTIONS:  You can put weight on your foot and ankle while in the boot.    You can use crutches or walker for support as needed.   You should wear boot when walking or standing for ~3 weeks.  You can take off your boot while bathing, sitting, stretching, icing or doing therapy exercises.  You can take your boot off at nighttime while sleeping.    BOOT WEANING:  DAY 1-- come out of boot for 1 hour (wear supportive athletic shoes and orthotic inserts), rest of the day in boot  DAY 2-- come out of boot for 2 hours (wear supportive athletic shoes and orthotic inserts), rest of the day in boot  DAY 3-- come out of boot for 3 hours (wear supportive athletic shoes and orthotic inserts), rest of the day in boot  DAY 4-- come out of boot for 4 hours (wear supportive athletic shoes and orthotic inserts), rest of the day in boot  DAY 5-- come out of boot and wear supportive shoes and orthotic inserts  * if you have pain while weaning out of boot then go back one day in the protocol (i.e. If really sore on the morning of Day 3 from coming out of boot for 2 hours the previous day, go back to Day 1 and start again through the protocol)    You can also use OTC Voltaren gel or  aspercreme ointment and apply it to the injured area.      Ice and elevate supported at the calf to reduce swelling    The patient was given a prescription for physical therapy.  Physical therapy is medically necessary to improve strength, balance, range of motion and functional outcomes after injury and/or surgery.    1. Follow stretching exercises that were on a separate handout   2. Hold each stretch for a least 1 minute  3. Do not bounce while stretching  4. Stretch for 10 minutes at a time, 3x a day for 6 weeks then daily  5. Remember, it takes several weeks to a few months of consistent stretching to increase flexibility and decrease symptoms.     The stretching program (see handout) should be done for about 10 minutes, three times a day.   You should stretch for 3 times a day for 6 weeks and then daily afterwards to maintain your flexibility.    You can use a plantar fascial night splint to sleep in at night. It should be worn for 6 weeks nightly and as needed after that period of time.  This is to minimize your stiffness and pain with the first few steps in the morning.    To help support your foot, you can purchase inserts over the counter. You want to look for full length inserts with a foam arch (not flat gel ones). Brands such as [employer] or others work well. Full length inserts give a little more support than the short ones. But, if you use a full length insert, you often will have to remove the insole that came with the shoe and then put the insert you buy then in the shoe    You can use an Achillotrain brace to off load the ankle    Follow up as needed

## 2024-09-25 NOTE — PROGRESS NOTES
"NPV-   History of Present Illness  54 y.o.male here for     1. Achilles tendinosis  Referral to Physical Therapy    Walking Boot Tall      2. Right foot pain  XR foot right 3+ views      3. Posterior tibial tendon dysfunction  Referral to Physical Therapy    Walking Boot Tall        Mechanism of injury:  none, wearing boots that were too big  Date of Injury/Pain: 2-3 months ago  Location of pain: posterior heel/ankle  Frequency of Pain: worse with walking, stairs  Associated symptoms? Swelling.  Previous treatment? Rest, compression sleeve    27 point review of systems negative except what is stated in HPI    On examination of the right ankle/foot:  Slight antalgic gait  Pes planus alignment  Minimal swelling; No ecchymosis or erythema. Skin intact; no ulcers or lesions.   Normal ROM in  ankle plantarflexion, dorsiflexion, inversion and eversion; normal ROM in 2-5th toe flexion/extension and 1st MTP flexion/extension  Normal strength in ankle plantarflexion, dorsiflexion, inversion and eversion; normal strength with great toe flexion/extension  Tenderness to palpation: Achilles tendon  No tenderness to palpation over the medial and lateral malleolus, posterior tibial tendon, peroneal tendon, ATFL, deltoid ligament, talus or navicular.  no tenderness to palpation over heel, plantar arch, base of 1st metatarsal/2nd metatarsal, sesamoids, 5th metatarsal, medial cuneiform, navicular, 1st MTP joint or 2nd or 3rd intermetarsal space.  Neurovascularly intact. Good capillary refill. No sensory deficit to light touch. Normal proprioception. Dorsalis pedis and posterior tibial pulses 2+ bilaterally.    - Hernandez's test                              - Dorsiflexion-eversion test  - Anterior Drawer test                      - resisted dorsiflexion-eversion test  - Talar tilt test                                    - shuck testing  - Squeeze test  + \"too many toes\" sign     I personally reviewed the patient's x-ray images and " reports of the right ankle. The xrays show no fractures or dislocation.  Normal joint spacing.   Achilles enthesophyte     ASSESSMENT: right Achilles tendinosis     PLAN: Treatment options were discussed with the patient. Patient was placed in a tall CAM walker boot to be WBAT with crutches.  They were given boot instructions. The patient was given a prescription for physical therapy.  Physical therapy is medically necessary to improve strength, balance, range of motion and functional outcomes after injury and/or surgery. Patient was given a handout and instructed on an at home stretching program.  They should do these exercises 3 times per day for 6 weeks and then daily. Patient can use OTC aspercream for pain and continue to ice and elevate supported at the calf to reduce swelling. All the patient's questions were answered. The patient agrees with the above plan.  Follow up in 4-6 weeks or as needed    Patient was prescribed a CAM walker boot for Achilles tendonitis.The patient is ambulatory with or without aid; but, has weakness, instability and/or deformity of their right ankle/foot which requires stabilization from this orthosis to improve their function.      Verbal and written instructions for the use, wear schedule, cleaning and application of this item were given.  Patient was instructed that should the brace result in increased pain, decreased sensation, increased swelling, or an overall worsening of their medical condition, to please contact our office immediately.     Orthotic management and training was provided for skin care, modifications due to healing tissues, edema changes, interruption in skin integrity, and safety precautions with the orthosis.

## 2024-10-29 ENCOUNTER — LAB (OUTPATIENT)
Dept: LAB | Facility: LAB | Age: 54
End: 2024-10-29
Payer: COMMERCIAL

## 2024-10-29 DIAGNOSIS — M06.4 INFLAMMATORY POLYARTHROPATHY (MULTI): Primary | ICD-10-CM

## 2024-10-29 LAB
ERYTHROCYTE [SEDIMENTATION RATE] IN BLOOD BY WESTERGREN METHOD: 12 MM/H (ref 0–20)
URATE SERPL-MCNC: 4.5 MG/DL (ref 4–7.5)

## 2024-10-29 PROCEDURE — 84550 ASSAY OF BLOOD/URIC ACID: CPT

## 2024-10-29 PROCEDURE — 86431 RHEUMATOID FACTOR QUANT: CPT

## 2024-10-29 PROCEDURE — 81381 HLA I TYPING 1 ALLELE HR: CPT

## 2024-10-29 PROCEDURE — 85652 RBC SED RATE AUTOMATED: CPT

## 2024-10-29 PROCEDURE — 86038 ANTINUCLEAR ANTIBODIES: CPT

## 2024-10-29 PROCEDURE — 36415 COLL VENOUS BLD VENIPUNCTURE: CPT

## 2024-10-30 LAB
ANA SER QL HEP2 SUBST: NEGATIVE
RHEUMATOID FACT SER NEPH-ACNC: <10 IU/ML (ref 0–15)

## 2024-11-01 LAB — HLAB27 TYPING: NEGATIVE

## 2024-11-04 ENCOUNTER — APPOINTMENT (OUTPATIENT)
Dept: PHYSICAL THERAPY | Facility: CLINIC | Age: 54
End: 2024-11-04
Payer: COMMERCIAL

## 2024-11-12 ENCOUNTER — EVALUATION (OUTPATIENT)
Dept: PHYSICAL THERAPY | Facility: CLINIC | Age: 54
End: 2024-11-12
Payer: COMMERCIAL

## 2024-11-12 DIAGNOSIS — M76.829 POSTERIOR TIBIAL TENDON DYSFUNCTION: ICD-10-CM

## 2024-11-12 DIAGNOSIS — M67.88 ACHILLES TENDINOSIS: ICD-10-CM

## 2024-11-12 PROCEDURE — 97161 PT EVAL LOW COMPLEX 20 MIN: CPT | Mod: GP | Performed by: PHYSICAL THERAPIST

## 2024-11-12 PROCEDURE — 97110 THERAPEUTIC EXERCISES: CPT | Mod: GP | Performed by: PHYSICAL THERAPIST

## 2024-11-12 ASSESSMENT — PAIN SCALES - GENERAL: PAINLEVEL_OUTOF10: 4

## 2024-11-12 ASSESSMENT — PAIN - FUNCTIONAL ASSESSMENT: PAIN_FUNCTIONAL_ASSESSMENT: 0-10

## 2024-11-12 NOTE — PROGRESS NOTES
Physical Therapy Evaluation    Patient Name: Jaleel Ferris  MRN: [id number]  Today's Date: 11/13/2024  Time Calculation  Start Time: 0549  Stop Time: 0626  Time Calculation (min): 37 min    Visit #: 1  Visits approved: 80 PT/OT/ST.  Certification dates: NA    Precautions:  Precautions  Precautions Comment: None. Low fall risk.    Subjective/History    Other Measures  Lower Extremity Funtional Score (LEFS): 14    DOI: 9/12/24.  Mechanism of injury: Wearing ill fitted shoes that rubbed on post ankle.  Area of symptoms: Intermittent throbbing pain at post right ankle. Pain Assessment: 0-10  0-10 (Numeric) Pain Score: 4  Aggravating factors: Stand 4 hrs. Walk 5 min. Push gas or break pedal.   Easing factors: Cut-out back of shoe.  Red flags: None.   Occupation: Machinists.   Recreation/Hobbies/Sports: Sedentary.   Living situation: 2-story home- does stairs safely with rails.  Barriers to treatment: None.  Preferred language: English.    Objective Findings  Observation/gait: Mod/severe bilat over pronation. Gait: Mod/severe bilat over pronation, mild bila thip ER in stance.  Heel raise x 1: pain.   AROM DF: 5 deg bilat. PF: wnl. Inversion: wnl. Eversion: wnl.   PROM DF: ~8 deg- tight, min pain.  Muscle activation/Resisted testing: DF: wnl. PF: see above. Glute med: wnl.   Palpation: Thick, TTP dist achilles.     Treatment:   Standing gastroc and soleus stretch- HEP 30 sec, 4x/day.  Instructed in correct foot wear (better motion control, arch support. Minimize pressure on post ankle/calcaneus). Pt states he will explore work boot options with boot reps that are at his job site.    Assessment  Patient presents with positive resisted testing, decr DF ROM, palpation findings and positional faults consistent with dist achilles tendinopathy.    Plan  Physical therapy 1-2 times/week for 6-8 wks. Therapy may include the following: Therapeutic exercise, manual therapy, education/home program, gait training.     Goals: Stand as  necessary for work w/o pain. Walk 20 min for exer w/o pain. Push gas or break pedal for driving w/o pain.   ST goal: DF to 10 deg in 3 wks.

## 2024-12-17 ENCOUNTER — APPOINTMENT (OUTPATIENT)
Dept: PHYSICAL THERAPY | Facility: CLINIC | Age: 54
End: 2024-12-17
Payer: COMMERCIAL

## 2025-01-14 ENCOUNTER — APPOINTMENT (OUTPATIENT)
Dept: PHYSICAL THERAPY | Facility: CLINIC | Age: 55
End: 2025-01-14
Payer: COMMERCIAL

## 2025-01-27 ENCOUNTER — TELEPHONE (OUTPATIENT)
Dept: PRIMARY CARE | Facility: CLINIC | Age: 55
End: 2025-01-27
Payer: COMMERCIAL

## 2025-01-27 DIAGNOSIS — M54.50 CHRONIC LOW BACK PAIN WITHOUT SCIATICA, UNSPECIFIED BACK PAIN LATERALITY: ICD-10-CM

## 2025-01-27 DIAGNOSIS — G89.29 OTHER CHRONIC PAIN: ICD-10-CM

## 2025-01-27 DIAGNOSIS — G89.29 CHRONIC LOW BACK PAIN WITHOUT SCIATICA, UNSPECIFIED BACK PAIN LATERALITY: ICD-10-CM

## 2025-01-27 RX ORDER — GABAPENTIN 800 MG/1
800 TABLET ORAL 3 TIMES DAILY
Qty: 90 TABLET | Refills: 0 | Status: SHIPPED | OUTPATIENT
Start: 2025-01-27 | End: 2025-02-26

## 2025-01-28 ENCOUNTER — APPOINTMENT (OUTPATIENT)
Dept: PHYSICAL THERAPY | Facility: CLINIC | Age: 55
End: 2025-01-28
Payer: COMMERCIAL

## 2025-02-10 ENCOUNTER — APPOINTMENT (OUTPATIENT)
Dept: PRIMARY CARE | Facility: CLINIC | Age: 55
End: 2025-02-10
Payer: COMMERCIAL

## 2025-02-10 VITALS
WEIGHT: 310 LBS | DIASTOLIC BLOOD PRESSURE: 89 MMHG | OXYGEN SATURATION: 93 % | TEMPERATURE: 94.6 F | SYSTOLIC BLOOD PRESSURE: 134 MMHG | HEART RATE: 86 BPM | BODY MASS INDEX: 39.8 KG/M2

## 2025-02-10 DIAGNOSIS — F41.9 ANXIETY: ICD-10-CM

## 2025-02-10 DIAGNOSIS — G89.29 CHRONIC LOW BACK PAIN WITHOUT SCIATICA, UNSPECIFIED BACK PAIN LATERALITY: Primary | ICD-10-CM

## 2025-02-10 DIAGNOSIS — M54.50 CHRONIC LOW BACK PAIN WITHOUT SCIATICA, UNSPECIFIED BACK PAIN LATERALITY: Primary | ICD-10-CM

## 2025-02-10 DIAGNOSIS — I71.23 ANEURYSM OF DESCENDING THORACIC AORTA WITHOUT RUPTURE (CMS-HCC): ICD-10-CM

## 2025-02-10 DIAGNOSIS — F11.11: ICD-10-CM

## 2025-02-10 DIAGNOSIS — Z12.11 SCREENING FOR MALIGNANT NEOPLASM OF COLON: ICD-10-CM

## 2025-02-10 DIAGNOSIS — I28.8 ENLARGED PULMONARY ARTERY (MULTI): ICD-10-CM

## 2025-02-10 PROBLEM — E78.5 HYPERLIPIDEMIA: Status: ACTIVE | Noted: 2025-02-10

## 2025-02-10 PROBLEM — R40.0 HAS DAYTIME DROWSINESS: Status: RESOLVED | Noted: 2023-03-28 | Resolved: 2025-02-10

## 2025-02-10 PROBLEM — N52.9 ERECTILE DYSFUNCTION: Status: ACTIVE | Noted: 2025-02-10

## 2025-02-10 PROBLEM — R40.0 DAYTIME SOMNOLENCE: Status: ACTIVE | Noted: 2025-02-10

## 2025-02-10 PROBLEM — G47.19 EXCESSIVE DAYTIME SLEEPINESS: Status: RESOLVED | Noted: 2023-03-28 | Resolved: 2025-02-10

## 2025-02-10 PROCEDURE — 99214 OFFICE O/P EST MOD 30 MIN: CPT | Performed by: FAMILY MEDICINE

## 2025-02-10 RX ORDER — GABAPENTIN 800 MG/1
800 TABLET ORAL 3 TIMES DAILY
Qty: 270 TABLET | Refills: 3 | Status: SHIPPED | OUTPATIENT
Start: 2025-02-10 | End: 2026-02-10

## 2025-02-10 ASSESSMENT — ENCOUNTER SYMPTOMS: DEPRESSION: 0

## 2025-02-10 ASSESSMENT — PAIN SCALES - GENERAL: PAINLEVEL_OUTOF10: 0-NO PAIN

## 2025-02-10 NOTE — PROGRESS NOTES
"Subjective   Patient ID: Jaleel Ferris is a 54 y.o. male who presents for Follow-up, Med Refill, and Dental Pain.    HPI   6 month follow up    Prescription drug use for several years, \" everything-Fentanyl,Oxycntine, MS contin,oxycodone,hydrocodone\" for back pain.  He was taperd off and started on gabapentin by pain clinic  10+ years.    Work: [employer],\" [employer]\"      Review of Systems   All other systems reviewed and are negative.      Objective   /89 (BP Location: Left arm, Patient Position: Sitting)   Pulse 86   Temp 34.8 °C (94.6 °F) (Temporal)   Wt 141 kg (310 lb)   SpO2 93%   BMI 39.80 kg/m²     Physical Exam  Vitals and nursing note reviewed.   Cardiovascular:      Rate and Rhythm: Normal rate.   Pulmonary:      Effort: Pulmonary effort is normal.      Breath sounds: Normal breath sounds.   Musculoskeletal:      Cervical back: Neck supple.   Lymphadenopathy:      Cervical: No cervical adenopathy.   Neurological:      Mental Status: He is alert.   Psychiatric:         Speech: Speech is rapid and pressured.         Behavior: Behavior is agitated.         Cognition and Memory: Cognition and memory normal.         Assessment/Plan   Diagnoses and all orders for this visit:  Chronic low back pain without sciatica, unspecified back pain laterality  -     gabapentin (Neurontin) 800 mg tablet; Take 1 tablet (800 mg) by mouth 3 times a day.  Screening for malignant neoplasm of colon  -     Colonoscopy Screening; Average Risk Patient; Future  Enlarged pulmonary artery (Multi)  Aneurysm of descending thoracic aorta without rupture (CMS-HCC)  Anxiety  H/O opioid abuse (Multi)         "

## 2025-02-11 ENCOUNTER — APPOINTMENT (OUTPATIENT)
Dept: PHYSICAL THERAPY | Facility: CLINIC | Age: 55
End: 2025-02-11
Payer: COMMERCIAL

## 2025-02-11 PROBLEM — F11.11 H/O OPIOID ABUSE (MULTI): Status: ACTIVE | Noted: 2025-02-11

## 2025-03-06 ENCOUNTER — TELEPHONE (OUTPATIENT)
Dept: PRIMARY CARE | Facility: CLINIC | Age: 55
End: 2025-03-06
Payer: COMMERCIAL

## 2025-03-10 ENCOUNTER — APPOINTMENT (OUTPATIENT)
Dept: PRIMARY CARE | Facility: CLINIC | Age: 55
End: 2025-03-10
Payer: COMMERCIAL

## 2026-02-17 ENCOUNTER — APPOINTMENT (OUTPATIENT)
Dept: PRIMARY CARE | Facility: CLINIC | Age: 56
End: 2026-02-17
Payer: COMMERCIAL